# Patient Record
Sex: FEMALE | Race: OTHER | HISPANIC OR LATINO | URBAN - METROPOLITAN AREA
[De-identification: names, ages, dates, MRNs, and addresses within clinical notes are randomized per-mention and may not be internally consistent; named-entity substitution may affect disease eponyms.]

---

## 2017-02-22 ENCOUNTER — EMERGENCY (EMERGENCY)
Facility: HOSPITAL | Age: 67
LOS: 1 days | Discharge: HOME | End: 2017-02-22
Attending: EMERGENCY MEDICINE | Admitting: EMERGENCY MEDICINE
Payer: MEDICARE

## 2017-02-22 VITALS
RESPIRATION RATE: 18 BRPM | TEMPERATURE: 98 F | DIASTOLIC BLOOD PRESSURE: 83 MMHG | SYSTOLIC BLOOD PRESSURE: 138 MMHG | OXYGEN SATURATION: 100 % | HEART RATE: 84 BPM

## 2017-02-22 VITALS
DIASTOLIC BLOOD PRESSURE: 67 MMHG | RESPIRATION RATE: 18 BRPM | OXYGEN SATURATION: 100 % | HEART RATE: 92 BPM | SYSTOLIC BLOOD PRESSURE: 155 MMHG

## 2017-02-22 DIAGNOSIS — Z95.1 PRESENCE OF AORTOCORONARY BYPASS GRAFT: Chronic | ICD-10-CM

## 2017-02-22 PROCEDURE — 99284 EMERGENCY DEPT VISIT MOD MDM: CPT

## 2017-02-22 PROCEDURE — 93926 LOWER EXTREMITY STUDY: CPT

## 2017-02-22 PROCEDURE — 99284 EMERGENCY DEPT VISIT MOD MDM: CPT | Mod: 25

## 2017-02-22 PROCEDURE — 93926 LOWER EXTREMITY STUDY: CPT | Mod: 26,RT

## 2017-02-22 RX ORDER — ACETAMINOPHEN 500 MG
975 TABLET ORAL ONCE
Qty: 0 | Refills: 0 | Status: DISCONTINUED | OUTPATIENT
Start: 2017-02-22 | End: 2017-02-22

## 2017-02-22 NOTE — ED PROVIDER NOTE - CARE PLAN
Principal Discharge DX:	Right knee pain, unspecified chronicity  Instructions for follow-up, activity and diet:	Wear compression stocking on R leg for swelling and comfort.  An ultrasound was performed in the ER with results consistent with a baker's cyst. No evidence of DVT.  Follow up with orthopedic in 48-72 hours. Information provided. Recommend repeat ultrasound in 1 week.  Follow up with your primary doctor this week.   Return to ER for worsening pain, fever, numbness/tingling, leg swelling, or any other concerning symptoms.

## 2017-02-22 NOTE — ED PROVIDER NOTE - LOCATION
leg knee/leg leg/knee/RLE: no erythema or ecchymosis noted, full ROM at knee and ankle, + ttp posterior knee without area of fluctuance noted, no effusion, no joint line tenderness, DP/TP pulses 2+, + varicose veins b/l knee/RLE: no erythema or ecchymosis noted, full ROM at knee and ankle, + ttp posterior knee with small area of fluctuance noted, no joint line tenderness, DP/TP pulses 2+, + varicose veins b/l/leg

## 2017-02-22 NOTE — ED ADULT NURSE NOTE - OBJECTIVE STATEMENT
65yo female pt AxOx3 ambulatory to ED c/o lump to back of R knee [id number]. Pt states pain is worsening. Pt states no PCP but cardiologist prescribed topical Motrin and pt states she doesn't want to take it anymore because she doesn't want to get a hear attack. Steady gait. Golf ball sized swollen area noted to back of R knee, skin intact, no discoloration. NAD noted. MD at bedside for eval. Family at bedside. Awaiting dispo.

## 2017-02-22 NOTE — ED PROVIDER NOTE - OBJECTIVE STATEMENT
67yo F [place] speaking (requests daughter as ) PMH HTN and CABG presenting with intermittent R leg pain for 6 months, constant now for 1 month.  Reports 10/10 pain, radiates down anterior shin. Pt also reports swelling of golf ball sized lump behind her R knee. Pain and swelling worsen with a lot of walking and improve with rest and lying flat. Pt reports she was sent to pain management by her cardiologist and given voltaren gel that has provided no relief. Able to walk. Denies fever/chills, trauma to knee/leg, numbness/tingling, trauma, pedal/ankle swelling, rash, recent travel, smoking, hormone use. 67yo F [place] speaking (requests daughter as ) PMH HTN and CABG on eliquis presenting with intermittent R leg pain for 6 months, constant now for 1 month.  Reports 10/10 pain, radiates down anterior shin. Pt also reports swelling of golf ball sized lump behind her R knee. Pain and swelling worsen with a lot of walking and improve with rest and lying flat. Pt reports she was sent to pain management by her cardiologist and given voltaren gel that has provided no relief. Able to walk. Denies fever/chills, trauma to knee/leg, numbness/tingling, trauma, pedal/ankle swelling, rash, recent travel, smoking, hormone use.

## 2017-02-22 NOTE — ED PROVIDER NOTE - PROGRESS NOTE DETAILS
Attending MD Saunders: US with likely baker's cyst, no DVT noted. Patient instructed to f/u with orthopedics in 1 week for management

## 2017-02-22 NOTE — ED PROVIDER NOTE - MEDICAL DECISION MAKING DETAILS
Attending MD Saunders: 66F s/p CABG, on eliquis for unknown reasons presenting with acute on chronic right posterior knee swelling and pain, exam with localized swelling to popliteal fossa, likely Baker's cyst but will obtain DVT US to rule out DVT

## 2017-02-22 NOTE — ED PROVIDER NOTE - ATTENDING CONTRIBUTION TO CARE
Attending MD Saunders:   I personally have seen and examined this patient.  Physician assistant note reviewed and agree on plan of care and except where noted.  See HPI, PE, and MDM for details.

## 2017-02-22 NOTE — ED PROVIDER NOTE - PLAN OF CARE
Wear compression stocking on R leg for swelling and comfort.  An ultrasound was performed in the ER with results consistent with a baker's cyst. No evidence of DVT.  Follow up with orthopedic in 48-72 hours. Information provided. Recommend repeat ultrasound in 1 week.  Follow up with your primary doctor this week.   Return to ER for worsening pain, fever, numbness/tingling, leg swelling, or any other concerning symptoms.

## 2017-02-22 NOTE — ED PROVIDER NOTE - PHYSICAL EXAMINATION
Attending MD Saunders: A & O x 3, NAD, right popliteal fossa with localized swelling and ttp, no overlying skin changes, right knee with full painless AROM, good distal pulses in b/l feet

## 2017-02-25 DIAGNOSIS — Z95.1 PRESENCE OF AORTOCORONARY BYPASS GRAFT: ICD-10-CM

## 2017-02-25 DIAGNOSIS — M25.561 PAIN IN RIGHT KNEE: ICD-10-CM

## 2017-02-25 DIAGNOSIS — I10 ESSENTIAL (PRIMARY) HYPERTENSION: ICD-10-CM

## 2017-02-25 DIAGNOSIS — Z88.8 ALLERGY STATUS TO OTHER DRUGS, MEDICAMENTS AND BIOLOGICAL SUBSTANCES STATUS: ICD-10-CM

## 2017-02-25 DIAGNOSIS — Z88.0 ALLERGY STATUS TO PENICILLIN: ICD-10-CM

## 2017-02-25 DIAGNOSIS — Z95.5 PRESENCE OF CORONARY ANGIOPLASTY IMPLANT AND GRAFT: ICD-10-CM

## 2020-02-05 NOTE — ED PROVIDER NOTE - NS ED ATTENDING STATEMENT MOD
Dr. Schuster spoke with patients spouse and informed in detail MRI result of 11/27/2019.  Informed of 1.  Focal irregular area of restricted diffusion identified in the left periventricular corona   radiata measuring around 1.5 x 1.4 cm in axial dimensions.  Findings are compatible with acute   ischemia.  2.  In addition this finding there is less intense DWI signal with isointense ADC signal   identified along the margins of the previous ischemic stroke in the left temporal, insular and      frontal lobes.  Small amount of corresponding enhancement.  Findings are suggestive of subacute   ischemic changes along the margins of the prior cerebral infarction.  3.  No generalized mass effect or midline shift.   - Eliquis  - Atorva 40  - BP < 130/80, restart home BP meds slowly to get there  - Educated on recovery from stroke symptoms  - Educated on recrudescence of old stroke symptoms  - Educated to call 911 with new stroke symptoms  - Educated no extra ASA with new stroke symptoms     Spouse verbalzied understanding, patient has f/u appt with neurologist Khloe Shook MD 03/02/2020.   I have personally performed a face to face diagnostic evaluation on this patient. I have reviewed the PA note and agree with the history, exam, and plan of care, except as noted.

## 2025-02-20 NOTE — ED PROVIDER NOTE - DATA REVIEWED, MDM
Detail Level: Detailed Quality 137: Melanoma: Continuity Of Care - Recall System: Patient information entered into a recall system that includes: target date for the next exam specified AND a process to follow up with patients regarding missed or unscheduled appointments Detail Level: Generalized Skin Checks Recommendations: Annual FBSE and Monthly self checks Sunscreen Recommendations: SPF 30+ daily on sun exposed areas vital signs/nurses notes